# Patient Record
Sex: MALE | Race: WHITE
[De-identification: names, ages, dates, MRNs, and addresses within clinical notes are randomized per-mention and may not be internally consistent; named-entity substitution may affect disease eponyms.]

---

## 2018-10-03 ENCOUNTER — HOSPITAL ENCOUNTER (EMERGENCY)
Dept: HOSPITAL 80 - FED | Age: 43
Discharge: HOME | End: 2018-10-03
Payer: COMMERCIAL

## 2018-10-03 VITALS — SYSTOLIC BLOOD PRESSURE: 107 MMHG | DIASTOLIC BLOOD PRESSURE: 60 MMHG

## 2018-10-03 DIAGNOSIS — R07.9: Primary | ICD-10-CM

## 2018-10-03 NOTE — EDPHY
H & P


Time Seen by Provider: 10/03/18 15:00


HPI/ROS: 





CHIEF COMPLAINT:  Sharp left-sided chest pain





HISTORY OF PRESENT ILLNESS:  43-year-old man presents today with symptoms this 

this morning she describes as sharp shooting pain on the left side of his chest 

with the tip of the scapula which last for couple minutes at a time and has 

been happening several times an hours since this morning.  He says it feels"sore

", does not radiate, no associated jaw or neck symptoms, no cough or shortness 

of breath, no diaphoresis or radiation to arms.


Patient is on metformin for"prediabetes"but does not smoke, no cocaine, no 

history of venous thromboembolism.





REVIEW OF SYSTEMS:


Eye: no change in vision


ENT: no sore throat


Cardiac:  No palpitations or syncope


Pulmonary:  HPI


Abdomen: no vomiting, diarrhea, abdominal pain


Musculoskeletal:  HPI, no leg swelling


Skin: no rash


Neuro: no headache


Constitutional: no fever


: no urinary symptoms





A comprehensive 10 point review of systems is otherwise negative aside from 

elements mentioned in the history of present illness.





PAST MEDICAL HISTORY:  Includes bipolar 2, hypothyroid,"prediabetes on metformin

"
Family history:  Father had coronary disease in his late 50s, no family 

history of venous thromboembolism.


Social history:  Former smoker, no drugs, no recent travel or immobilization.  

No recent surgery.





General Appearance: Alert and conversant, cooperative.


Eyes: No scleral  icterus. 


ENT, Mouth: Normal mucous membranes.


Respiratory: Normal respiratory effort, breath sounds equal, lungs are clear to 

auscultation.  No splinting, no wheezing.


Cardiovascular:  Regular rate and rhythm.  No murmur.


Gastrointestinal:  Abdomen is soft and non tender.


Neurological: Alert, face symmetric, normal motor and sensory in extremities. 


Skin: Warm and dry, no rashes.


Musculoskeletal: No peripheral edema.  Legs equal, no calf tenderness.


Psychiatric: Not agitated.





Emergency Department course/MDM:





Symptoms are very atypical, history would be low likelihood for acute coronary 

syndrome.  Patient is perc negative for pulmonary embolism.  Plan for EKG chest 

x-ray and troponin.  More likely to be inflammatory such as pleurisy with 

recent upper respiratory infection.





Labs discussed the patient.


Smoking Status: Never smoked


Constitutional: 


 Initial Vital Signs











Temperature (C)  36.9 C   10/03/18 14:41


 


Heart Rate  88   10/03/18 14:41


 


Respiratory Rate  18   10/03/18 14:41


 


Blood Pressure  169/116 H  10/03/18 14:41


 


O2 Sat (%)  95   10/03/18 14:41








 











O2 Delivery Mode               Room Air














Allergies/Adverse Reactions: 


 





Penicillins Allergy (Verified 10/03/18 14:39)


 








Home Medications: 














 Medication  Instructions  Recorded


 


Adderall 10 MG (*)  10/03/18


 


Hydroxyzine HCl  10/03/18


 


Levothyroxine  10/03/18


 


Liothyronine Sodium  10/03/18


 


Lithium Aspartate  10/03/18


 


Metformin HCl  10/03/18


 


Rexulti  10/03/18


 


VYVANSE  10/03/18














Medical Decision Making





- Diagnostics


EKG Interpretation: 





12-lead EKG interpreted by me; official reading is in computer system.  My 

interpretation is sinus rhythm rate 76 normal intervals and no ischemic changes.


Imaging Results: 


 Imaging Impressions





Chest X-Ray  10/03/18 15:10


Impression: Enlarged either azygos node or vein without cardiomegaly or other 

evidence of elevated venous pressures.


 











Imaging: I viewed and interpreted images myself


Differential Diagnosis: 





Differential diagnosis considered for chest pain including but not limited to 

myocardial ischemia, aortic dissection, pericarditis, pulmonary embolus, chest 

wall pain, pleural inflammation and pulmonary infectious causes.





- Data Points


Laboratory Results: 


 











  10/03/18





  15:48


 


POC Troponin I  0.00 ng/mL ng/mL





   (0.00-0.08) 











Point of Care Test Results: 


 Chemistry











  10/03/18





  15:48


 


POC Troponin I  0.00 ng/mL ng/mL





   (0.00-0.08) 














Departure





- Departure


Disposition: Home, Routine, Self-Care


Clinical Impression: 


Chest pain


Qualifiers:


 Chest pain type: unspecified Qualified Code(s): R07.9 - Chest pain, unspecified





Condition: Good


Instructions:  Chest Pain (ED), Pleurisy (ED)


Additional Instructions: 


You had a normal EKG and chest x-ray, and a negative troponin test.


Referrals: 


Antonio Hernández MD [Medical Doctor] - 2-3 days, if not improved

## 2018-10-03 NOTE — CPEKG
Test Reason : OPEN

Blood Pressure : ***/*** mmHG

Vent. Rate : 076 BPM     Atrial Rate : 076 BPM

   P-R Int : 186 ms          QRS Dur : 098 ms

    QT Int : 386 ms       P-R-T Axes : 055 -03 014 degrees

   QTc Int : 435 ms

 

Sinus rhythm

 

Confirmed by Ben Allen ([id number]) on 10/3/2018 3:24:44 PM

 

Referred By:             Confirmed By:Ben Allen